# Patient Record
Sex: MALE | Race: WHITE | NOT HISPANIC OR LATINO | URBAN - NONMETROPOLITAN AREA
[De-identification: names, ages, dates, MRNs, and addresses within clinical notes are randomized per-mention and may not be internally consistent; named-entity substitution may affect disease eponyms.]

---

## 2021-04-14 ENCOUNTER — IMPORTED ENCOUNTER (OUTPATIENT)
Dept: URBAN - NONMETROPOLITAN AREA CLINIC 1 | Facility: CLINIC | Age: 71
End: 2021-04-14

## 2021-04-14 PROBLEM — H43.813: Noted: 2021-04-14

## 2021-04-14 PROBLEM — H25.043: Noted: 2021-04-14

## 2021-04-14 PROBLEM — H52.4: Noted: 2021-04-14

## 2021-04-14 PROCEDURE — 92004 COMPRE OPH EXAM NEW PT 1/>: CPT

## 2021-04-14 PROCEDURE — 92015 DETERMINE REFRACTIVE STATE: CPT

## 2021-04-14 NOTE — PATIENT DISCUSSION
Posterior Subcapsular Cataracts OU-  discussed findings w/patient-  unable to correct vision w/glasses at this time-  refer patient to [address] [place] [address] for cat eval-  continue to monitor as per JSPVD OU-  discussed findings w/patient-  Retina flat 360 with no breaks tears or heme. -  S&S of RD/RT reviewed with pt. -  Stressed that pt should contact our office right away with any changes or increase in symptoms.-  Continue to monitor as scheduled or prnCompound Myopic Astigmatism OU w/Presbyopia-  discussed findings w/patient-  no spectacle Rx issued-  continue to monitor as per [address] [place] [address]; 's Notes: MR 4/14/2021DFE 4/14/2021

## 2021-06-11 ENCOUNTER — IMPORTED ENCOUNTER (OUTPATIENT)
Dept: URBAN - NONMETROPOLITAN AREA CLINIC 1 | Facility: CLINIC | Age: 71
End: 2021-06-11

## 2021-06-11 PROCEDURE — 92014 COMPRE OPH EXAM EST PT 1/>: CPT

## 2021-06-11 NOTE — PATIENT DISCUSSION
Cataract(s)-Visually significant cataract OU .-Cataract(s) causing symptomatic impairment of visual function not correctable with a tolerable change in glasses or contact lenses lighting or non-operative means resulting in specific activity limitations and/or participation restrictions including but not limited to reading viewing television driving or meeting vocational or recreational needs. -Expectation is clearer vision and functional improvement in symptoms as well as reduced glare disability after cataract removal.-Order IOLMaster and OPD today. -Recommend Stand/Trad based on today's OPD testing and lifestyle questionnaire.-All questions were answered regarding surgery including pre and post-op medications appointments activity restrictions and anesthetic usage.-The risks benefits and alternatives and special risk factors for the patient were discussed in detail including but not limited to: bleeding infection retinal detachment vitreous loss problems with the implant and possible need for additional surgery.-Although rare the possibility of complete vision loss was discussed.-The possible need for glasses post-operatively was discussed.-Order medical clearance exam based on history of hypertension-Patient elects to proceed with cataract surgery OS . Will schedule at patient's convenience and re-evaluate OD  in the future. Discussed all lens and patient elects Stand/Trad OU and discussed need for bifocals s/p surgery *EHR system down down of exam all info input 6/14/21 by Margarette GRIFFITH *; 's Notes: MR 4/14/2021DFE 4/14/2021

## 2021-06-14 PROBLEM — H43.813: Noted: 2021-04-14

## 2021-06-14 PROBLEM — H25.813: Noted: 2021-06-14

## 2021-06-14 PROBLEM — H52.4: Noted: 2021-04-14

## 2021-09-22 PROBLEM — H43.813: Noted: 2021-09-22

## 2021-09-22 PROBLEM — H52.4: Noted: 2021-09-22

## 2021-09-22 PROBLEM — H25.813: Noted: 2021-09-22

## 2021-09-23 ENCOUNTER — IMPORTED ENCOUNTER (OUTPATIENT)
Dept: URBAN - NONMETROPOLITAN AREA CLINIC 1 | Facility: CLINIC | Age: 71
End: 2021-09-23

## 2021-09-23 NOTE — PATIENT DISCUSSION
Medical Clearance-Medical clearance done today. -No outstanding concerns that would preclude surgery.-Patient is cleared to proceed with scheduled surgery.; 's Notes: MR 4/14/2021DFE 4/14/2021

## 2021-10-12 ENCOUNTER — IMPORTED ENCOUNTER (OUTPATIENT)
Dept: URBAN - NONMETROPOLITAN AREA CLINIC 1 | Facility: CLINIC | Age: 71
End: 2021-10-12

## 2021-10-12 PROBLEM — H52.4: Noted: 2021-10-12

## 2021-10-12 PROBLEM — H25.11: Noted: 2021-10-12

## 2021-10-12 PROBLEM — Z98.42: Noted: 2021-10-12

## 2021-10-12 PROBLEM — H43.813: Noted: 2021-10-12

## 2021-10-12 PROCEDURE — 99024 POSTOP FOLLOW-UP VISIT: CPT

## 2021-10-12 NOTE — PATIENT DISCUSSION
s/p PCIOL-Pt doing well s/p PCIOL. -Continue post-op gtts according to instruction sheet and sleep with eye shield over eye for 7 nights.-Avoid bending at the waist lifting anything over 5lbs and dirty or prudence environments. -RTC .; 's Notes:  4/14/2021D 4/14/2021

## 2021-10-18 ENCOUNTER — IMPORTED ENCOUNTER (OUTPATIENT)
Dept: URBAN - NONMETROPOLITAN AREA CLINIC 1 | Facility: CLINIC | Age: 71
End: 2021-10-18

## 2021-10-18 PROBLEM — H25.811: Noted: 2021-10-18

## 2021-10-18 PROBLEM — H52.4: Noted: 2021-10-12

## 2021-10-18 PROBLEM — H43.813: Noted: 2021-10-12

## 2021-10-18 PROBLEM — Z98.42: Noted: 2021-10-12

## 2021-10-18 PROCEDURE — 99024 POSTOP FOLLOW-UP VISIT: CPT

## 2021-10-18 NOTE — PATIENT DISCUSSION
Cataract(s)-Visually significant cataract OD. -Cataract(s) causing symptomatic impairment of visual function not correctable with a tolerable change in glasses or contact lenses lighting or non-operative means resulting in specific activity limitations and/or participation restrictions including but not limited to reading viewing television driving or meeting vocational or recreational needs. -Expectation is clearer vision and functional improvement in symptoms as well as reduced glare disability after cataract removal.-Recommend Stand/trad based on previous OPD testing and lifestyle questionnaire.-All questions were answered regarding surgery including pre and post-op medications appointments activity restrictions and anesthetic usage.-The risks benefits and alternatives and special risk factors for the patient were discussed in detail including but not limited to: bleeding infection retinal detachment vitreous loss problems with the implant and possible need for additional surgery.-Although rare the possibility of complete vision loss was discussed.-The need for glasses post-operatively was discussed.-Patient elects to proceed with cataract surgery OD . s/p PCIOL-Pt doing well at 1 week s/p PCIOL. -Continue post-op gtts according to instruction sheet.-Okay to resume usual activites and d/c eye shield.; 's Notes: MR 4/14/2021DFE 4/14/2021

## 2021-11-15 ENCOUNTER — IMPORTED ENCOUNTER (OUTPATIENT)
Dept: URBAN - NONMETROPOLITAN AREA CLINIC 1 | Facility: CLINIC | Age: 71
End: 2021-11-15

## 2021-11-15 PROBLEM — Z01.818: Noted: 2021-11-15

## 2021-11-15 PROBLEM — I10: Noted: 2021-11-15

## 2021-11-15 PROBLEM — H25.811: Noted: 2021-10-18

## 2021-11-30 ENCOUNTER — IMPORTED ENCOUNTER (OUTPATIENT)
Dept: URBAN - NONMETROPOLITAN AREA CLINIC 1 | Facility: CLINIC | Age: 71
End: 2021-11-30

## 2021-11-30 PROBLEM — Z98.41: Noted: 2021-11-30

## 2021-11-30 PROCEDURE — 99024 POSTOP FOLLOW-UP VISIT: CPT

## 2021-12-06 ENCOUNTER — IMPORTED ENCOUNTER (OUTPATIENT)
Dept: URBAN - NONMETROPOLITAN AREA CLINIC 1 | Facility: CLINIC | Age: 71
End: 2021-12-06

## 2021-12-06 ENCOUNTER — PREPPED CHART (OUTPATIENT)
Dept: RURAL CLINIC 1 | Facility: CLINIC | Age: 71
End: 2021-12-06

## 2021-12-06 PROCEDURE — 99024 POSTOP FOLLOW-UP VISIT: CPT

## 2021-12-06 NOTE — PATIENT DISCUSSION
s/p PCIOL-Pt doing well at 1 week s/p PCIOL. -Continue post-op gtts according to instruction sheet.-Okay to resume usual activites and d/c eye shield.; 's Notes: MR 4/14/2021D 4/14/2021

## 2022-03-05 ASSESSMENT — VISUAL ACUITY
OS_SC: 20/25
OD_SC: 20/20

## 2022-03-05 ASSESSMENT — TONOMETRY
OS_IOP_MMHG: 15
OD_IOP_MMHG: 18

## 2022-03-07 ENCOUNTER — FOLLOW UP (OUTPATIENT)
Dept: RURAL CLINIC 1 | Facility: CLINIC | Age: 72
End: 2022-03-07

## 2022-03-07 DIAGNOSIS — H26.493: ICD-10-CM

## 2022-03-07 PROCEDURE — 92014 COMPRE OPH EXAM EST PT 1/>: CPT

## 2022-03-07 ASSESSMENT — TONOMETRY
OS_IOP_MMHG: 18
OD_IOP_MMHG: 18

## 2022-03-07 ASSESSMENT — VISUAL ACUITY
OD_SC: 20/20
OS_SC: 20/20-2

## 2022-04-09 ASSESSMENT — VISUAL ACUITY
OD_CC: 20/400
OD_GLARE: 20/400
OS_AM: 20/100
OS_CC: 20/25+2
OS_CC: 20/40-2
OS_SC: 20/70
OS_PH: 20/60+
OS_CC: 20/50-2
OD_PH: 20/100
OD_PAM: 20/70
OS_GLARE: 20/400
OD_CC: 20/20
OD_PH: 20/60
OD_CC: 20/200
OS_CC: 20/200

## 2022-04-09 ASSESSMENT — TONOMETRY
OD_IOP_MMHG: 20
OS_IOP_MMHG: 15
OD_IOP_MMHG: 22
OS_IOP_MMHG: 22
OD_IOP_MMHG: 22
OD_IOP_MMHG: 24
OS_IOP_MMHG: 20
OD_IOP_MMHG: 20
OS_IOP_MMHG: 25
OD_IOP_MMHG: 18
OS_IOP_MMHG: 22